# Patient Record
Sex: FEMALE | Race: WHITE | Employment: OTHER | URBAN - METROPOLITAN AREA
[De-identification: names, ages, dates, MRNs, and addresses within clinical notes are randomized per-mention and may not be internally consistent; named-entity substitution may affect disease eponyms.]

---

## 2018-06-22 ENCOUNTER — EST. PATIENT EMERGENCY (OUTPATIENT)
Dept: URBAN - METROPOLITAN AREA CLINIC 36 | Facility: CLINIC | Age: 68
End: 2018-06-22

## 2018-06-22 DIAGNOSIS — H43.812: ICD-10-CM

## 2018-06-22 DIAGNOSIS — H53.8: ICD-10-CM

## 2018-06-22 PROCEDURE — 92014 COMPRE OPH EXAM EST PT 1/>: CPT

## 2018-06-22 ASSESSMENT — VISUAL ACUITY
OS_SC: J<10
OD_PH: 20/70+2
OD_SC: 20/100-1
OD_SC: J8
OS_PH: 20/70-2
OS_SC: 20/200-1

## 2018-06-22 ASSESSMENT — TONOMETRY
OS_IOP_MMHG: 17
OD_IOP_MMHG: 14

## 2018-06-27 ENCOUNTER — CONSULT (OUTPATIENT)
Dept: URBAN - METROPOLITAN AREA CLINIC 36 | Facility: CLINIC | Age: 68
End: 2018-06-27

## 2018-06-27 DIAGNOSIS — H35.723: ICD-10-CM

## 2018-06-27 DIAGNOSIS — H53.8: ICD-10-CM

## 2018-06-27 DIAGNOSIS — H43.12: ICD-10-CM

## 2018-06-27 DIAGNOSIS — H43.812: ICD-10-CM

## 2018-06-27 DIAGNOSIS — H35.3132: ICD-10-CM

## 2018-06-27 DIAGNOSIS — H43.811: ICD-10-CM

## 2018-06-27 PROCEDURE — 92014 COMPRE OPH EXAM EST PT 1/>: CPT

## 2018-06-27 PROCEDURE — 92134 CPTRZ OPH DX IMG PST SGM RTA: CPT

## 2018-06-27 PROCEDURE — 9222550 BILAT EXTENDED OPHTHALMOSCOPY, FIRST

## 2018-06-27 ASSESSMENT — TONOMETRY
OD_IOP_MMHG: 14
OS_IOP_MMHG: 12

## 2018-06-27 ASSESSMENT — VISUAL ACUITY
OD_PH: 20/70+1
OD_SC: 20/200
OS_PH: 20/60-1
OS_SC: 20/200

## 2018-07-11 ENCOUNTER — ESTABLISHED PATIENT (OUTPATIENT)
Dept: URBAN - METROPOLITAN AREA CLINIC 36 | Facility: CLINIC | Age: 68
End: 2018-07-11

## 2018-07-11 DIAGNOSIS — H35.3112: ICD-10-CM

## 2018-07-11 DIAGNOSIS — H35.723: ICD-10-CM

## 2018-07-11 DIAGNOSIS — H35.3122: ICD-10-CM

## 2018-07-11 DIAGNOSIS — H35.363: ICD-10-CM

## 2018-07-11 DIAGNOSIS — H43.12: ICD-10-CM

## 2018-07-11 DIAGNOSIS — H43.813: ICD-10-CM

## 2018-07-11 PROCEDURE — 92012 INTRM OPH EXAM EST PATIENT: CPT

## 2018-07-11 PROCEDURE — 9222650 BILAT EXTENDED OPHTHALMOSCOPY, F/U

## 2018-07-11 PROCEDURE — 92242 FLUORESCEIN&ICG ANGIOGRAPHY: CPT

## 2018-07-11 PROCEDURE — 92134 CPTRZ OPH DX IMG PST SGM RTA: CPT

## 2018-07-11 ASSESSMENT — TONOMETRY
OS_IOP_MMHG: 18
OD_IOP_MMHG: 18

## 2018-07-11 ASSESSMENT — VISUAL ACUITY
OD_SC: 20/400
OS_SC: 20/100+1
OD_PH: 20/70-1
OS_PH: 20/80

## 2018-08-02 ENCOUNTER — ESTABLISHED COMPREHENSIVE EXAM (OUTPATIENT)
Dept: URBAN - METROPOLITAN AREA CLINIC 47 | Facility: CLINIC | Age: 68
End: 2018-08-02

## 2018-08-02 DIAGNOSIS — H40.033: ICD-10-CM

## 2018-08-02 DIAGNOSIS — H25.813: ICD-10-CM

## 2018-08-02 DIAGNOSIS — H52.203: ICD-10-CM

## 2018-08-02 PROCEDURE — 92310-2 LEVEL 2 CONTACT LENS MANAGEMENT

## 2018-08-02 PROCEDURE — 92012 INTRM OPH EXAM EST PATIENT: CPT

## 2018-08-02 PROCEDURE — 92015 DETERMINE REFRACTIVE STATE: CPT

## 2018-08-02 ASSESSMENT — VISUAL ACUITY
OS_CC: J4
OD_CC: 20/40
OD_CC: J8
OS_CC: 20/60
OU_CC: 20/40
OU_CC: J4

## 2018-08-02 ASSESSMENT — TONOMETRY
OS_IOP_MMHG: 18
OD_IOP_MMHG: 17

## 2018-09-05 ENCOUNTER — ESTABLISHED PATIENT (OUTPATIENT)
Dept: URBAN - METROPOLITAN AREA CLINIC 36 | Facility: CLINIC | Age: 68
End: 2018-09-05

## 2018-09-05 DIAGNOSIS — H43.12: ICD-10-CM

## 2018-09-05 DIAGNOSIS — H35.3122: ICD-10-CM

## 2018-09-05 DIAGNOSIS — H35.3112: ICD-10-CM

## 2018-09-05 DIAGNOSIS — H43.813: ICD-10-CM

## 2018-09-05 DIAGNOSIS — H35.723: ICD-10-CM

## 2018-09-05 DIAGNOSIS — H35.363: ICD-10-CM

## 2018-09-05 PROCEDURE — 92134 CPTRZ OPH DX IMG PST SGM RTA: CPT

## 2018-09-05 PROCEDURE — 92242 FLUORESCEIN&ICG ANGIOGRAPHY: CPT

## 2018-09-05 PROCEDURE — 9222650 BILAT EXTENDED OPHTHALMOSCOPY, F/U

## 2018-09-05 PROCEDURE — 92012 INTRM OPH EXAM EST PATIENT: CPT

## 2018-09-05 ASSESSMENT — VISUAL ACUITY
OD_CC: 20/50+1
OS_CC: 20/40+1

## 2018-09-05 ASSESSMENT — TONOMETRY
OS_IOP_MMHG: 13
OD_IOP_MMHG: 18

## 2019-05-01 ENCOUNTER — ESTABLISHED COMPREHENSIVE EXAM (OUTPATIENT)
Dept: URBAN - METROPOLITAN AREA CLINIC 36 | Facility: CLINIC | Age: 69
End: 2019-05-01

## 2019-05-01 DIAGNOSIS — H43.813: ICD-10-CM

## 2019-05-01 DIAGNOSIS — H35.3112: ICD-10-CM

## 2019-05-01 DIAGNOSIS — H35.723: ICD-10-CM

## 2019-05-01 DIAGNOSIS — H35.3122: ICD-10-CM

## 2019-05-01 PROCEDURE — 9222650 BILAT EXTENDED OPHTHALMOSCOPY, F/U

## 2019-05-01 PROCEDURE — 92250 FUNDUS PHOTOGRAPHY W/I&R: CPT

## 2019-05-01 PROCEDURE — 92014 COMPRE OPH EXAM EST PT 1/>: CPT

## 2019-05-01 PROCEDURE — 92134 CPTRZ OPH DX IMG PST SGM RTA: CPT

## 2019-05-01 PROCEDURE — 92235 FLUORESCEIN ANGRPH MLTIFRAME: CPT

## 2019-05-01 ASSESSMENT — VISUAL ACUITY
OD_SC: 20/200
OS_PH: 20/50-1
OS_SC: 20/200+1
OD_PH: 20/60-3

## 2019-05-01 ASSESSMENT — TONOMETRY
OS_IOP_MMHG: 10
OD_IOP_MMHG: 14

## 2019-09-20 ENCOUNTER — EST. PATIENT EMERGENCY (OUTPATIENT)
Dept: URBAN - METROPOLITAN AREA CLINIC 36 | Facility: CLINIC | Age: 69
End: 2019-09-20

## 2019-09-20 DIAGNOSIS — H35.3112: ICD-10-CM

## 2019-09-20 DIAGNOSIS — H35.3122: ICD-10-CM

## 2019-09-20 DIAGNOSIS — H40.033: ICD-10-CM

## 2019-09-20 DIAGNOSIS — H53.8: ICD-10-CM

## 2019-09-20 PROCEDURE — 92134 CPTRZ OPH DX IMG PST SGM RTA: CPT

## 2019-09-20 PROCEDURE — 92014 COMPRE OPH EXAM EST PT 1/>: CPT

## 2019-09-20 ASSESSMENT — TONOMETRY
OD_IOP_MMHG: 16
OS_IOP_MMHG: 14

## 2019-09-20 ASSESSMENT — VISUAL ACUITY
OS_CC: 20/40-2
OD_CC: 20/40

## 2020-01-08 ENCOUNTER — RETINA CONSULT (OUTPATIENT)
Dept: URBAN - METROPOLITAN AREA CLINIC 36 | Facility: CLINIC | Age: 70
End: 2020-01-08

## 2020-01-08 DIAGNOSIS — H35.363: ICD-10-CM

## 2020-01-08 DIAGNOSIS — H53.8: ICD-10-CM

## 2020-01-08 DIAGNOSIS — H35.3112: ICD-10-CM

## 2020-01-08 DIAGNOSIS — H40.033: ICD-10-CM

## 2020-01-08 DIAGNOSIS — H35.3122: ICD-10-CM

## 2020-01-08 DIAGNOSIS — H35.723: ICD-10-CM

## 2020-01-08 DIAGNOSIS — H43.813: ICD-10-CM

## 2020-01-08 PROCEDURE — 92014 COMPRE OPH EXAM EST PT 1/>: CPT

## 2020-01-08 PROCEDURE — 92250 FUNDUS PHOTOGRAPHY W/I&R: CPT

## 2020-01-08 PROCEDURE — 92201 OPSCPY EXTND RTA DRAW UNI/BI: CPT

## 2020-01-08 ASSESSMENT — TONOMETRY
OD_IOP_MMHG: 11
OS_IOP_MMHG: 11

## 2020-01-08 ASSESSMENT — VISUAL ACUITY
OD_CC: 20/40-1
OS_CC: 20/50-1

## 2021-02-11 ENCOUNTER — ESTABLISHED PATIENT (OUTPATIENT)
Dept: URBAN - METROPOLITAN AREA CLINIC 36 | Facility: CLINIC | Age: 71
End: 2021-02-11

## 2021-02-11 DIAGNOSIS — D49.2: ICD-10-CM

## 2021-02-11 PROCEDURE — 67810 INCAL BX EYELID SKN LID MRGN: CPT

## 2021-02-11 PROCEDURE — 99212 OFFICE O/P EST SF 10 MIN: CPT

## 2021-02-11 RX ORDER — ERYTHROMYCIN 5 MG/G
OINTMENT OPHTHALMIC TWICE A DAY
End: 2021-02-25

## 2021-02-11 ASSESSMENT — VISUAL ACUITY
OS_PH: 20/50-1
OD_CC: 20/40-2
OS_CC: 20/80-1

## 2021-03-11 ENCOUNTER — PRE-OP/H&P (OUTPATIENT)
Dept: URBAN - METROPOLITAN AREA CLINIC 36 | Facility: CLINIC | Age: 71
End: 2021-03-11

## 2021-03-11 DIAGNOSIS — H53.8: ICD-10-CM

## 2021-03-11 DIAGNOSIS — D49.2: ICD-10-CM

## 2021-03-11 DIAGNOSIS — H35.30: ICD-10-CM

## 2021-03-11 DIAGNOSIS — H35.723: ICD-10-CM

## 2021-03-11 DIAGNOSIS — H35.3122: ICD-10-CM

## 2021-03-11 DIAGNOSIS — H35.3112: ICD-10-CM

## 2021-03-11 DIAGNOSIS — H40.033: ICD-10-CM

## 2021-03-11 DIAGNOSIS — H43.813: ICD-10-CM

## 2021-03-11 DIAGNOSIS — H35.363: ICD-10-CM

## 2021-03-11 PROCEDURE — 99211HP H&P OFFICE/OUTPATIENT VISIT, EST

## 2021-03-11 RX ORDER — TRAMADOL HCL 50 MG/1
1 TABLET ORAL
Start: 2021-04-06

## 2021-03-11 RX ORDER — ERYTHROMYCIN 5 MG/G
OINTMENT OPHTHALMIC
Start: 2021-04-06

## 2021-04-06 ENCOUNTER — SURGERY/PROCEDURE (OUTPATIENT)
Dept: URBAN - METROPOLITAN AREA SURGERY 14 | Facility: SURGERY | Age: 71
End: 2021-04-06

## 2021-04-06 ENCOUNTER — PRE-OP/H&P (OUTPATIENT)
Dept: URBAN - METROPOLITAN AREA SURGERY 14 | Facility: SURGERY | Age: 71
End: 2021-04-06

## 2021-04-06 DIAGNOSIS — C44.1192: ICD-10-CM

## 2021-04-06 DIAGNOSIS — H53.8: ICD-10-CM

## 2021-04-06 DIAGNOSIS — D49.2: ICD-10-CM

## 2021-04-06 PROCEDURE — 99213 OFFICE O/P EST LOW 20 MIN: CPT

## 2021-04-06 PROCEDURE — 14061 TIS TRNFR E/N/E/L10.1-30SQCM: CPT

## 2021-04-12 ENCOUNTER — TECH ONLY (OUTPATIENT)
Dept: URBAN - METROPOLITAN AREA CLINIC 44 | Facility: CLINIC | Age: 71
End: 2021-04-12

## 2021-04-12 DIAGNOSIS — D49.2: ICD-10-CM

## 2021-04-12 PROCEDURE — 99211T TECH SERVICE

## 2021-10-20 ENCOUNTER — ESTABLISHED COMPREHENSIVE EXAM (OUTPATIENT)
Dept: URBAN - METROPOLITAN AREA CLINIC 36 | Facility: CLINIC | Age: 71
End: 2021-10-20

## 2021-10-20 DIAGNOSIS — H52.7: ICD-10-CM

## 2021-10-20 DIAGNOSIS — H25.813: ICD-10-CM

## 2021-10-20 DIAGNOSIS — H35.3122: ICD-10-CM

## 2021-10-20 DIAGNOSIS — H35.3112: ICD-10-CM

## 2021-10-20 DIAGNOSIS — H40.033: ICD-10-CM

## 2021-10-20 PROCEDURE — 92310-1 LEVEL 1 CONTACT LENS MANAGEMENT

## 2021-10-20 PROCEDURE — 92310PDW PREMIUM SPECIALTY DAILY WEAR

## 2021-10-20 PROCEDURE — 92014 COMPRE OPH EXAM EST PT 1/>: CPT

## 2021-10-20 PROCEDURE — 92015 DETERMINE REFRACTIVE STATE: CPT

## 2021-10-20 ASSESSMENT — VISUAL ACUITY
OD_PH: 20/40+2
OS_CC: J1
OS_SC: 20/80
OS_CC: 20/80-1
OD_CC: 20/40-1
OS_PH: 20/50+2
OD_CC: J2
OD_SC: CF 6FT
OU_CC: 20/40+2
OS_SC: J10
OD_SC: >J10

## 2021-12-22 NOTE — PROCEDURE NOTE: CLINICAL
PROCEDURE NOTE: Removal of Corneal FB at Slit Lamp #1 OS. Diagnosis: Corneal Foreign Body. Anesthesia: Topical. The patient, the procedure, and the correct site were identified initially. Prior to treatment, the risks/benefits/alternatives were discussed. The patient wished to proceed with procedure. Corneal foreign body was removed using a 25 gauge needle at the slit lamp. Patient tolerated procedure well. There were no complications. Post-op instructions given. The residual rust ring was carefully buffed out of the corneal stroma. Patient tolerated procedure well. Gia Nunez

## 2022-08-26 ENCOUNTER — COMPREHENSIVE EXAM (OUTPATIENT)
Dept: URBAN - METROPOLITAN AREA CLINIC 36 | Facility: CLINIC | Age: 72
End: 2022-08-26

## 2022-08-26 DIAGNOSIS — H40.033: ICD-10-CM

## 2022-08-26 DIAGNOSIS — H25.813: ICD-10-CM

## 2022-08-26 DIAGNOSIS — H35.3132: ICD-10-CM

## 2022-08-26 DIAGNOSIS — G45.3: ICD-10-CM

## 2022-08-26 DIAGNOSIS — H52.7: ICD-10-CM

## 2022-08-26 DIAGNOSIS — H35.363: ICD-10-CM

## 2022-08-26 PROCEDURE — 92310-1 LEVEL 1 CONTACT LENS MANAGEMENT

## 2022-08-26 PROCEDURE — 92015GRNC REFRACTION GLASSES RECHECK NO CHARGE

## 2022-08-26 PROCEDURE — 92014 COMPRE OPH EXAM EST PT 1/>: CPT

## 2022-08-26 PROCEDURE — 92310PDW PREMIUM SPECIALTY DAILY WEAR

## 2022-08-26 ASSESSMENT — VISUAL ACUITY
OD_PH: 20/40+2
OS_CC: 20/100+1
OU_CC: J2
OS_PH: 20/60+2
OS_CC: J3
OS_SC: 20/50-2
OU_CC: 20/50+2
OD_SC: 20/100
OD_SC: J10
OD_CC: 20/50+1
OS_SC: J10
OD_CC: J5

## 2022-08-26 ASSESSMENT — TONOMETRY
OD_IOP_MMHG: 14
OS_IOP_MMHG: 14

## 2023-04-14 ENCOUNTER — COMPREHENSIVE EXAM (OUTPATIENT)
Dept: URBAN - METROPOLITAN AREA CLINIC 36 | Facility: CLINIC | Age: 73
End: 2023-04-14

## 2023-04-14 DIAGNOSIS — H35.363: ICD-10-CM

## 2023-04-14 DIAGNOSIS — H25.813: ICD-10-CM

## 2023-04-14 DIAGNOSIS — H40.033: ICD-10-CM

## 2023-04-14 DIAGNOSIS — H35.3132: ICD-10-CM

## 2023-04-14 DIAGNOSIS — Z97.3: ICD-10-CM

## 2023-04-14 PROCEDURE — 92014 COMPRE OPH EXAM EST PT 1/>: CPT

## 2023-04-14 PROCEDURE — 92015 DETERMINE REFRACTIVE STATE: CPT

## 2023-04-14 PROCEDURE — 92134 CPTRZ OPH DX IMG PST SGM RTA: CPT

## 2023-04-14 ASSESSMENT — VISUAL ACUITY
OS_CC: 20/100-2
OU_SC: J1
OU_SC: 20/70
OD_SC: J2
OD_SC: 20/200
OD_CC: 20/30-1
OS_SC: J1
OS_SC: 20/70
OU_CC: 20/40+1

## 2023-04-14 ASSESSMENT — TONOMETRY
OS_IOP_MMHG: 13
OD_IOP_MMHG: 13

## 2023-06-02 ENCOUNTER — CONSULTATION/EVALUATION (OUTPATIENT)
Dept: URBAN - METROPOLITAN AREA CLINIC 36 | Facility: CLINIC | Age: 73
End: 2023-06-02

## 2023-06-02 DIAGNOSIS — H40.033: ICD-10-CM

## 2023-06-02 DIAGNOSIS — H18.513: ICD-10-CM

## 2023-06-02 DIAGNOSIS — H35.363: ICD-10-CM

## 2023-06-02 DIAGNOSIS — H25.813: ICD-10-CM

## 2023-06-02 PROCEDURE — 92025-3 CORNEAL TOPO, REFUSED

## 2023-06-02 PROCEDURE — 92286 ANT SGM IMG I&R SPECLR MIC: CPT

## 2023-06-02 PROCEDURE — 92004 COMPRE OPH EXAM NEW PT 1/>: CPT

## 2023-06-02 PROCEDURE — 92136TC INTERFEROMETRY - TECHNICAL COMPONENT

## 2023-06-02 RX ORDER — PREDNISOLONE ACETATE 10 MG/ML: 1 SUSPENSION/ DROPS OPHTHALMIC

## 2023-06-02 RX ORDER — KETOROLAC TROMETHAMINE 5 MG/ML: 1 SOLUTION OPHTHALMIC

## 2023-06-02 RX ORDER — MOXIFLOXACIN OPHTHALMIC 5 MG/ML: 1 SOLUTION/ DROPS OPHTHALMIC

## 2023-06-02 ASSESSMENT — VISUAL ACUITY
OS_CC: 20/70
OD_SC: 20/200-1
OD_CC: 20/30-2
OS_AM: 20/20
OD_BAT: 20/50
OS_BAT: 20/60
OD_RAM: 20/20
OS_SC: 20/70-2

## 2023-06-02 ASSESSMENT — TONOMETRY
OS_IOP_MMHG: 13
OD_IOP_MMHG: 15

## 2023-07-10 ENCOUNTER — PRE-OP/H&P (OUTPATIENT)
Dept: URBAN - METROPOLITAN AREA CLINIC 39 | Facility: CLINIC | Age: 73
End: 2023-07-10

## 2023-07-10 DIAGNOSIS — H40.033: ICD-10-CM

## 2023-07-10 DIAGNOSIS — H25.813: ICD-10-CM

## 2023-07-10 DIAGNOSIS — H18.513: ICD-10-CM

## 2023-07-10 PROCEDURE — 99211HP PRE-OP

## 2024-03-05 ENCOUNTER — EMERGENCY VISIT (OUTPATIENT)
Dept: URBAN - METROPOLITAN AREA CLINIC 36 | Facility: CLINIC | Age: 74
End: 2024-03-05

## 2024-03-05 DIAGNOSIS — H53.19: ICD-10-CM

## 2024-03-05 DIAGNOSIS — H25.813: ICD-10-CM

## 2024-03-05 DIAGNOSIS — H35.3132: ICD-10-CM

## 2024-03-05 PROCEDURE — 99214 OFFICE O/P EST MOD 30 MIN: CPT

## 2024-03-05 ASSESSMENT — VISUAL ACUITY
OS_CC: 20/70
OS_PH: 20/60-1
OD_CC: J3
OD_CC: 20/30
OS_SC: 20/80
OS_CC: J2
OD_SC: J10
OD_SC: 20/50
OS_SC: J10

## 2024-03-05 ASSESSMENT — TONOMETRY
OS_IOP_MMHG: 14
OD_IOP_MMHG: 14

## 2024-04-19 ENCOUNTER — CONSULTATION/EVALUATION (OUTPATIENT)
Dept: URBAN - METROPOLITAN AREA CLINIC 36 | Facility: CLINIC | Age: 74
End: 2024-04-19

## 2024-04-19 DIAGNOSIS — H35.3132: ICD-10-CM

## 2024-04-19 DIAGNOSIS — H18.513: ICD-10-CM

## 2024-04-19 DIAGNOSIS — H25.813: ICD-10-CM

## 2024-04-19 PROCEDURE — 92286 ANT SGM IMG I&R SPECLR MIC: CPT

## 2024-04-19 PROCEDURE — 92025-3 CORNEAL TOPO, REFUSED

## 2024-04-19 PROCEDURE — 92134 CPTRZ OPH DX IMG PST SGM RTA: CPT

## 2024-04-19 PROCEDURE — 92004 COMPRE OPH EXAM NEW PT 1/>: CPT

## 2024-04-19 PROCEDURE — 92136 OPHTHALMIC BIOMETRY: CPT

## 2024-04-19 RX ORDER — KETOROLAC TROMETHAMINE 5 MG/ML: 1 SOLUTION OPHTHALMIC

## 2024-04-19 RX ORDER — PREDNISOLONE ACETATE 10 MG/ML: 1 SUSPENSION/ DROPS OPHTHALMIC

## 2024-04-19 RX ORDER — MOXIFLOXACIN OPHTHALMIC 5 MG/ML: 1 SOLUTION/ DROPS OPHTHALMIC

## 2024-04-19 ASSESSMENT — VISUAL ACUITY
OD_SC: 20/200
OS_SC: 20/60+2
OS_CC: 20/200
OS_PH: 20/50
OS_BAT: 20/60
OD_CC: J4
OD_BAT: 20/50
OD_RAM: 20/20
OD_CC: 20/30+2
OS_AM: 20/20-2
OD_SC: <J12
OS_CC: J2
OS_SC: <J12

## 2024-04-19 ASSESSMENT — TONOMETRY
OD_IOP_MMHG: 13
OS_IOP_MMHG: 12

## 2024-05-13 ENCOUNTER — SURGERY/PROCEDURE (OUTPATIENT)
Facility: LOCATION | Age: 74
End: 2024-05-13

## 2024-05-13 ENCOUNTER — PRE-OP/H&P (OUTPATIENT)
Dept: URBAN - METROPOLITAN AREA CLINIC 39 | Facility: CLINIC | Age: 74
End: 2024-05-13

## 2024-05-13 DIAGNOSIS — H25.813: ICD-10-CM

## 2024-05-13 DIAGNOSIS — H27.8: ICD-10-CM

## 2024-05-13 DIAGNOSIS — H21.81: ICD-10-CM

## 2024-05-13 DIAGNOSIS — H18.513: ICD-10-CM

## 2024-05-13 PROCEDURE — 99211HP PRE-OP

## 2024-05-13 PROCEDURE — 66984 XCAPSL CTRC RMVL W/O ECP: CPT

## 2024-05-14 ENCOUNTER — POST-OP (OUTPATIENT)
Dept: URBAN - METROPOLITAN AREA CLINIC 36 | Facility: CLINIC | Age: 74
End: 2024-05-14

## 2024-05-14 DIAGNOSIS — Z96.1: ICD-10-CM

## 2024-05-14 PROCEDURE — 99024 POSTOP FOLLOW-UP VISIT: CPT

## 2024-05-14 ASSESSMENT — VISUAL ACUITY
OS_SC: J8
OD_SC: 20/70-2
OS_SC: 20/25
OD_SC: J12

## 2024-05-14 ASSESSMENT — TONOMETRY
OS_IOP_MMHG: 13
OD_IOP_MMHG: 13

## 2024-05-17 ENCOUNTER — POST OP/EVAL OF SECOND EYE (OUTPATIENT)
Dept: URBAN - METROPOLITAN AREA CLINIC 36 | Facility: CLINIC | Age: 74
End: 2024-05-17

## 2024-05-17 DIAGNOSIS — H18.513: ICD-10-CM

## 2024-05-17 DIAGNOSIS — H40.033: ICD-10-CM

## 2024-05-17 DIAGNOSIS — Z96.1: ICD-10-CM

## 2024-05-17 DIAGNOSIS — G43.B0: ICD-10-CM

## 2024-05-17 DIAGNOSIS — H35.3132: ICD-10-CM

## 2024-05-17 DIAGNOSIS — H25.811: ICD-10-CM

## 2024-05-17 PROCEDURE — 92012 INTRM OPH EXAM EST PATIENT: CPT | Mod: 24

## 2024-05-17 PROCEDURE — 99024 POSTOP FOLLOW-UP VISIT: CPT

## 2024-05-17 ASSESSMENT — TONOMETRY
OD_IOP_MMHG: 17
OS_IOP_MMHG: 12

## 2024-05-17 ASSESSMENT — VISUAL ACUITY
OD_SC: >J10
OD_SC: 20/200
OS_SC: 20/25-2
OD_PH: 20/70+2
OS_SC: J8

## 2024-05-20 ENCOUNTER — SURGERY/PROCEDURE (OUTPATIENT)
Facility: LOCATION | Age: 74
End: 2024-05-20

## 2024-05-20 ENCOUNTER — PRE-OP/H&P (OUTPATIENT)
Dept: URBAN - METROPOLITAN AREA CLINIC 39 | Facility: CLINIC | Age: 74
End: 2024-05-20

## 2024-05-20 DIAGNOSIS — H25.811: ICD-10-CM

## 2024-05-20 DIAGNOSIS — H35.3132: ICD-10-CM

## 2024-05-20 DIAGNOSIS — G43.B0: ICD-10-CM

## 2024-05-20 DIAGNOSIS — H18.513: ICD-10-CM

## 2024-05-20 DIAGNOSIS — H40.033: ICD-10-CM

## 2024-05-20 DIAGNOSIS — Z96.1: ICD-10-CM

## 2024-05-20 PROCEDURE — 66984 XCAPSL CTRC RMVL W/O ECP: CPT | Mod: 79,RT

## 2024-05-20 PROCEDURE — 99211HP PRE-OP

## 2024-05-21 ENCOUNTER — POST-OP (OUTPATIENT)
Dept: URBAN - METROPOLITAN AREA CLINIC 36 | Facility: CLINIC | Age: 74
End: 2024-05-21

## 2024-05-21 DIAGNOSIS — Z96.1: ICD-10-CM

## 2024-05-21 PROCEDURE — 99024 POSTOP FOLLOW-UP VISIT: CPT

## 2024-05-21 ASSESSMENT — VISUAL ACUITY
OD_SC: 20/25
OS_SC: 20/25+1
OD_SC: J5
OS_SC: J3

## 2024-05-21 ASSESSMENT — TONOMETRY
OD_IOP_MMHG: 16
OS_IOP_MMHG: 11

## 2024-05-28 ENCOUNTER — POST-OP (OUTPATIENT)
Dept: URBAN - METROPOLITAN AREA CLINIC 36 | Facility: CLINIC | Age: 74
End: 2024-05-28

## 2024-05-28 DIAGNOSIS — Z96.1: ICD-10-CM

## 2024-05-28 PROCEDURE — 99024 POSTOP FOLLOW-UP VISIT: CPT

## 2024-05-28 ASSESSMENT — VISUAL ACUITY
OD_SC: 20/30+2
OS_SC: 20/20-1
OD_SC: J8
OS_SC: J6
OU_SC: J6

## 2024-05-28 ASSESSMENT — TONOMETRY
OD_IOP_MMHG: 13
OS_IOP_MMHG: 12

## 2024-06-18 ENCOUNTER — POST-OP (OUTPATIENT)
Dept: URBAN - METROPOLITAN AREA CLINIC 36 | Facility: CLINIC | Age: 74
End: 2024-06-18

## 2024-06-18 DIAGNOSIS — Z96.1: ICD-10-CM

## 2024-06-18 PROCEDURE — 99024 POSTOP FOLLOW-UP VISIT: CPT

## 2024-06-18 ASSESSMENT — TONOMETRY
OD_IOP_MMHG: 14
OS_IOP_MMHG: 15

## 2024-06-18 ASSESSMENT — VISUAL ACUITY
OS_SC: 20/25-1
OU_SC: 20/20-2
OS_SC: J5
OD_SC: 20/25-1
OD_SC: J3
OU_SC: J3

## 2024-11-18 ENCOUNTER — COMPREHENSIVE EXAM (OUTPATIENT)
Dept: URBAN - METROPOLITAN AREA CLINIC 36 | Facility: CLINIC | Age: 74
End: 2024-11-18

## 2024-11-18 DIAGNOSIS — H26.493: ICD-10-CM

## 2024-11-18 DIAGNOSIS — H52.7: ICD-10-CM

## 2024-11-18 DIAGNOSIS — H18.513: ICD-10-CM

## 2024-11-18 DIAGNOSIS — Z96.1: ICD-10-CM

## 2024-11-18 DIAGNOSIS — H35.3131: ICD-10-CM

## 2024-11-18 PROCEDURE — 92014 COMPRE OPH EXAM EST PT 1/>: CPT

## 2024-11-18 PROCEDURE — 92134 CPTRZ OPH DX IMG PST SGM RTA: CPT

## 2024-11-18 PROCEDURE — 92015GRNC REFRACTION GLASSES RECHECK NO CHARGE
